# Patient Record
Sex: MALE | Race: WHITE | HISPANIC OR LATINO | Employment: FULL TIME | URBAN - METROPOLITAN AREA
[De-identification: names, ages, dates, MRNs, and addresses within clinical notes are randomized per-mention and may not be internally consistent; named-entity substitution may affect disease eponyms.]

---

## 2017-01-24 ENCOUNTER — GENERIC CONVERSION - ENCOUNTER (OUTPATIENT)
Dept: OTHER | Facility: OTHER | Age: 22
End: 2017-01-24

## 2017-02-24 ENCOUNTER — ALLSCRIPTS OFFICE VISIT (OUTPATIENT)
Dept: OTHER | Facility: OTHER | Age: 22
End: 2017-02-24

## 2017-02-24 DIAGNOSIS — E66.01 MORBID (SEVERE) OBESITY DUE TO EXCESS CALORIES (HCC): ICD-10-CM

## 2017-02-24 DIAGNOSIS — L83 ACANTHOSIS NIGRICANS: ICD-10-CM

## 2017-03-23 ENCOUNTER — GENERIC CONVERSION - ENCOUNTER (OUTPATIENT)
Dept: OTHER | Facility: OTHER | Age: 22
End: 2017-03-23

## 2017-03-23 ENCOUNTER — ALLSCRIPTS OFFICE VISIT (OUTPATIENT)
Dept: OTHER | Facility: OTHER | Age: 22
End: 2017-03-23

## 2017-04-28 ENCOUNTER — GENERIC CONVERSION - ENCOUNTER (OUTPATIENT)
Dept: OTHER | Facility: OTHER | Age: 22
End: 2017-04-28

## 2017-05-16 ENCOUNTER — ANESTHESIA EVENT (OUTPATIENT)
Dept: GASTROENTEROLOGY | Facility: HOSPITAL | Age: 22
End: 2017-05-16
Payer: COMMERCIAL

## 2017-05-17 ENCOUNTER — ANESTHESIA (OUTPATIENT)
Dept: GASTROENTEROLOGY | Facility: HOSPITAL | Age: 22
End: 2017-05-17
Payer: COMMERCIAL

## 2017-05-17 ENCOUNTER — HOSPITAL ENCOUNTER (OUTPATIENT)
Facility: HOSPITAL | Age: 22
Setting detail: OUTPATIENT SURGERY
Discharge: HOME | End: 2017-05-17
Attending: SURGERY | Admitting: SURGERY
Payer: COMMERCIAL

## 2017-05-17 VITALS
HEIGHT: 69 IN | OXYGEN SATURATION: 99 % | BODY MASS INDEX: 46.65 KG/M2 | TEMPERATURE: 98.2 F | WEIGHT: 315 LBS | RESPIRATION RATE: 18 BRPM | HEART RATE: 86 BPM | SYSTOLIC BLOOD PRESSURE: 135 MMHG | DIASTOLIC BLOOD PRESSURE: 70 MMHG

## 2017-05-17 DIAGNOSIS — E66.9 OBESITY: ICD-10-CM

## 2017-05-17 PROCEDURE — 88342 IMHCHEM/IMCYTCHM 1ST ANTB: CPT | Performed by: SURGERY

## 2017-05-17 PROCEDURE — 88305 TISSUE EXAM BY PATHOLOGIST: CPT | Performed by: SURGERY

## 2017-05-17 RX ORDER — PROPOFOL 10 MG/ML
INJECTION, EMULSION INTRAVENOUS AS NEEDED
Status: DISCONTINUED | OUTPATIENT
Start: 2017-05-17 | End: 2017-05-17 | Stop reason: SURG

## 2017-05-17 RX ORDER — SODIUM CHLORIDE 9 MG/ML
125 INJECTION, SOLUTION INTRAVENOUS CONTINUOUS
Status: DISCONTINUED | OUTPATIENT
Start: 2017-05-17 | End: 2017-05-17 | Stop reason: HOSPADM

## 2017-05-17 RX ADMIN — PROPOFOL 200 MG: 10 INJECTION, EMULSION INTRAVENOUS at 07:38

## 2017-05-17 RX ADMIN — SODIUM CHLORIDE 125 ML/HR: 0.9 INJECTION, SOLUTION INTRAVENOUS at 06:25

## 2017-05-25 ENCOUNTER — ALLSCRIPTS OFFICE VISIT (OUTPATIENT)
Dept: OTHER | Facility: OTHER | Age: 22
End: 2017-05-25

## 2017-06-01 ENCOUNTER — APPOINTMENT (OUTPATIENT)
Dept: LAB | Facility: MEDICAL CENTER | Age: 22
End: 2017-06-01
Attending: SURGERY
Payer: COMMERCIAL

## 2017-06-01 ENCOUNTER — TRANSCRIBE ORDERS (OUTPATIENT)
Dept: ADMINISTRATIVE | Facility: HOSPITAL | Age: 22
End: 2017-06-01

## 2017-06-01 DIAGNOSIS — E66.01 MORBID OBESITY, UNSPECIFIED OBESITY TYPE (HCC): ICD-10-CM

## 2017-06-01 DIAGNOSIS — E66.01 MORBID OBESITY, UNSPECIFIED OBESITY TYPE (HCC): Primary | ICD-10-CM

## 2017-06-01 LAB
ALBUMIN SERPL BCP-MCNC: 4.2 G/DL (ref 3.5–5)
ALP SERPL-CCNC: 113 U/L (ref 46–116)
ALT SERPL W P-5'-P-CCNC: 105 U/L (ref 12–78)
ANION GAP SERPL CALCULATED.3IONS-SCNC: 7 MMOL/L (ref 4–13)
AST SERPL W P-5'-P-CCNC: 53 U/L (ref 5–45)
BILIRUB SERPL-MCNC: 0.55 MG/DL (ref 0.2–1)
BUN SERPL-MCNC: 16 MG/DL (ref 5–25)
CALCIUM SERPL-MCNC: 9.2 MG/DL (ref 8.3–10.1)
CHLORIDE SERPL-SCNC: 105 MMOL/L (ref 100–108)
CHOLEST SERPL-MCNC: 206 MG/DL (ref 50–200)
CO2 SERPL-SCNC: 26 MMOL/L (ref 21–32)
CREAT SERPL-MCNC: 0.86 MG/DL (ref 0.6–1.3)
ERYTHROCYTE [DISTWIDTH] IN BLOOD BY AUTOMATED COUNT: 13.5 % (ref 11.6–15.1)
GFR SERPL CREATININE-BSD FRML MDRD: >60 ML/MIN/1.73SQ M
GLUCOSE P FAST SERPL-MCNC: 92 MG/DL (ref 65–99)
HCT VFR BLD AUTO: 45.1 % (ref 36.5–49.3)
HDLC SERPL-MCNC: 37 MG/DL (ref 40–60)
HGB BLD-MCNC: 15.3 G/DL (ref 12–17)
LDLC SERPL CALC-MCNC: 126 MG/DL (ref 0–100)
MCH RBC QN AUTO: 28.4 PG (ref 26.8–34.3)
MCHC RBC AUTO-ENTMCNC: 33.9 G/DL (ref 31.4–37.4)
MCV RBC AUTO: 84 FL (ref 82–98)
PLATELET # BLD AUTO: 316 THOUSANDS/UL (ref 149–390)
PMV BLD AUTO: 9.3 FL (ref 8.9–12.7)
POTASSIUM SERPL-SCNC: 3.8 MMOL/L (ref 3.5–5.3)
PROT SERPL-MCNC: 7.7 G/DL (ref 6.4–8.2)
RBC # BLD AUTO: 5.38 MILLION/UL (ref 3.88–5.62)
SODIUM SERPL-SCNC: 138 MMOL/L (ref 136–145)
TRIGL SERPL-MCNC: 214 MG/DL
WBC # BLD AUTO: 8.96 THOUSAND/UL (ref 4.31–10.16)

## 2017-06-01 PROCEDURE — 36415 COLL VENOUS BLD VENIPUNCTURE: CPT

## 2017-06-01 PROCEDURE — 80061 LIPID PANEL: CPT

## 2017-06-01 PROCEDURE — 85027 COMPLETE CBC AUTOMATED: CPT

## 2017-06-01 PROCEDURE — 80053 COMPREHEN METABOLIC PANEL: CPT

## 2017-06-12 ENCOUNTER — GENERIC CONVERSION - ENCOUNTER (OUTPATIENT)
Dept: OTHER | Facility: OTHER | Age: 22
End: 2017-06-12

## 2017-06-21 ENCOUNTER — APPOINTMENT (OUTPATIENT)
Dept: LAB | Facility: MEDICAL CENTER | Age: 22
End: 2017-06-21
Payer: COMMERCIAL

## 2017-06-21 ENCOUNTER — GENERIC CONVERSION - ENCOUNTER (OUTPATIENT)
Dept: OTHER | Facility: OTHER | Age: 22
End: 2017-06-21

## 2017-06-21 DIAGNOSIS — L83 ACANTHOSIS NIGRICANS: ICD-10-CM

## 2017-06-21 DIAGNOSIS — E66.01 MORBID OBESITY, UNSPECIFIED OBESITY TYPE (HCC): ICD-10-CM

## 2017-06-21 LAB
ALBUMIN SERPL BCP-MCNC: 4 G/DL (ref 3.5–5)
ALP SERPL-CCNC: 102 U/L (ref 46–116)
ALT SERPL W P-5'-P-CCNC: 95 U/L (ref 12–78)
ANION GAP SERPL CALCULATED.3IONS-SCNC: 9 MMOL/L (ref 4–13)
AST SERPL W P-5'-P-CCNC: 40 U/L (ref 5–45)
BILIRUB SERPL-MCNC: 0.54 MG/DL (ref 0.2–1)
BUN SERPL-MCNC: 14 MG/DL (ref 5–25)
CALCIUM SERPL-MCNC: 9.3 MG/DL (ref 8.3–10.1)
CHLORIDE SERPL-SCNC: 104 MMOL/L (ref 100–108)
CHOLEST SERPL-MCNC: 204 MG/DL (ref 50–200)
CO2 SERPL-SCNC: 27 MMOL/L (ref 21–32)
CREAT SERPL-MCNC: 0.8 MG/DL (ref 0.6–1.3)
ERYTHROCYTE [DISTWIDTH] IN BLOOD BY AUTOMATED COUNT: 13.2 % (ref 11.6–15.1)
EST. AVERAGE GLUCOSE BLD GHB EST-MCNC: 126 MG/DL
GFR SERPL CREATININE-BSD FRML MDRD: >60 ML/MIN/1.73SQ M
GLUCOSE P FAST SERPL-MCNC: 90 MG/DL (ref 65–99)
HBA1C MFR BLD: 6 % (ref 4.2–6.3)
HCT VFR BLD AUTO: 44.5 % (ref 36.5–49.3)
HDLC SERPL-MCNC: 33 MG/DL (ref 40–60)
HGB BLD-MCNC: 15.4 G/DL (ref 12–17)
LDLC SERPL CALC-MCNC: 116 MG/DL (ref 0–100)
MCH RBC QN AUTO: 28.7 PG (ref 26.8–34.3)
MCHC RBC AUTO-ENTMCNC: 34.6 G/DL (ref 31.4–37.4)
MCV RBC AUTO: 83 FL (ref 82–98)
PLATELET # BLD AUTO: 313 THOUSANDS/UL (ref 149–390)
PMV BLD AUTO: 9.4 FL (ref 8.9–12.7)
POTASSIUM SERPL-SCNC: 4.1 MMOL/L (ref 3.5–5.3)
PROT SERPL-MCNC: 7.6 G/DL (ref 6.4–8.2)
RBC # BLD AUTO: 5.37 MILLION/UL (ref 3.88–5.62)
SODIUM SERPL-SCNC: 140 MMOL/L (ref 136–145)
TRIGL SERPL-MCNC: 276 MG/DL
WBC # BLD AUTO: 9.32 THOUSAND/UL (ref 4.31–10.16)

## 2017-06-21 PROCEDURE — 80061 LIPID PANEL: CPT

## 2017-06-21 PROCEDURE — 83036 HEMOGLOBIN GLYCOSYLATED A1C: CPT

## 2017-06-21 PROCEDURE — 85027 COMPLETE CBC AUTOMATED: CPT

## 2017-06-21 PROCEDURE — 80053 COMPREHEN METABOLIC PANEL: CPT

## 2017-06-21 PROCEDURE — 36415 COLL VENOUS BLD VENIPUNCTURE: CPT

## 2017-06-22 ENCOUNTER — ALLSCRIPTS OFFICE VISIT (OUTPATIENT)
Dept: OTHER | Facility: OTHER | Age: 22
End: 2017-06-22

## 2017-06-22 DIAGNOSIS — Z01.810 ENCOUNTER FOR PREPROCEDURAL CARDIOVASCULAR EXAMINATION: ICD-10-CM

## 2017-07-06 ENCOUNTER — HOSPITAL ENCOUNTER (OUTPATIENT)
Dept: NON INVASIVE DIAGNOSTICS | Facility: HOSPITAL | Age: 22
Discharge: HOME | End: 2017-07-06
Attending: INTERNAL MEDICINE
Payer: COMMERCIAL

## 2017-07-06 DIAGNOSIS — Z01.810 ENCOUNTER FOR PREPROCEDURAL CARDIOVASCULAR EXAMINATION: ICD-10-CM

## 2017-07-06 PROCEDURE — 93017 CV STRESS TEST TRACING ONLY: CPT

## 2017-07-21 ENCOUNTER — GENERIC CONVERSION - ENCOUNTER (OUTPATIENT)
Dept: OTHER | Facility: OTHER | Age: 22
End: 2017-07-21

## 2017-07-28 ENCOUNTER — ALLSCRIPTS OFFICE VISIT (OUTPATIENT)
Dept: OTHER | Facility: OTHER | Age: 22
End: 2017-07-28

## 2017-08-15 ENCOUNTER — TRANSCRIBE ORDERS (OUTPATIENT)
Dept: ADMINISTRATIVE | Facility: HOSPITAL | Age: 22
End: 2017-08-15

## 2017-08-15 ENCOUNTER — APPOINTMENT (OUTPATIENT)
Dept: LAB | Facility: MEDICAL CENTER | Age: 22
End: 2017-08-15
Attending: SURGERY
Payer: COMMERCIAL

## 2017-08-15 DIAGNOSIS — E66.01 MORBID (SEVERE) OBESITY DUE TO EXCESS CALORIES (HCC): ICD-10-CM

## 2017-08-15 LAB — TSH SERPL DL<=0.05 MIU/L-ACNC: 1.34 UIU/ML (ref 0.36–3.74)

## 2017-08-15 PROCEDURE — 84443 ASSAY THYROID STIM HORMONE: CPT

## 2017-08-15 PROCEDURE — 36415 COLL VENOUS BLD VENIPUNCTURE: CPT

## 2017-08-18 ENCOUNTER — GENERIC CONVERSION - ENCOUNTER (OUTPATIENT)
Dept: OTHER | Facility: OTHER | Age: 22
End: 2017-08-18

## 2017-09-28 ENCOUNTER — ALLSCRIPTS OFFICE VISIT (OUTPATIENT)
Dept: OTHER | Facility: OTHER | Age: 22
End: 2017-09-28

## 2017-10-10 ENCOUNTER — GENERIC CONVERSION - ENCOUNTER (OUTPATIENT)
Dept: OTHER | Facility: OTHER | Age: 22
End: 2017-10-10

## 2017-10-27 NOTE — PROGRESS NOTES
Assessment  1  Morbid obesity (278 01) (E66 01)   2  Impaired fasting glucose (790 21) (R73 01)    Plan  Morbid obesity    · Omeprazole 20 MG Oral Capsule Delayed Release; take 1 capsule daily   Rx By: Efrain Ortiz; Dispense: 30 Days ; #:90 Capsule Delayed Release; Refill: 3;For: Morbid obesity; LUIS = N; Verified Transmission to [facility]; Last Updated By: [email or web address]; 9/28/2017 3:03:47 PM    Discussion/Summary  Discussion Summary:   20 yo male morbidly obese found to be a good candidate to undergo a weight loss operation upon being enrolled here at the [facility]  been pre certified to undergo a Laparoscopic Gastric Bypass  today to review his pre op test results  been medically cleared for the procedure have discussed with him at length the risks and benefits of the operation and reiterated the components of our multidisciplinary program and the importance of compliance and follow up in the post operative period  Although there is a great statistical chance of improvement or even resolution of most of his associated comorbidities, the results vary from patient to patient and they largely depend on his commitment  patient was also instructed with regards to the importance of behavior modification, nutritional counseling, support meeting attendance and lifestyle changes that are important to ensure success  was given the opportunity to ask questions and I have answered all of them have addressed with the patient the level of CODE STATUS for this hospital stay and after explaining the different options currently he wishes to be a Level I  understands and wishes to proceed  needs to lose 16 lbs prior to surgery  return next week for a weight check and if the weight loss is at least half the way there, the surgery date will not be re scheduled  Patient understands and agrees                Chief Complaint  Chief Complaint Free Text Note Form: Patient in office today for final H&P/RNY  History of Present Illness  HPI: 19-year-old male with a long-standing history morbid obesity  was found to be a good candidate to undergo a bariatric operation upon [facility]  here today to discuss details of the surgery  Bariatric Surgery Pre-op History and Physical (Brief) [facility]: The patient is being seen for a pre-op history and physical visit for bariatric surgery  Symptoms:  inability to lose weight  Review of Systems  Complete-Male:   Constitutional: No fever or chills, feels well, no tiredness, no recent weight gain or weight loss  Eyes: No complaints of eye pain, no red eyes, no discharge from eyes, no itchy eyes  ENT: no complaints of earache, no hearing loss, no nosebleeds, no nasal discharge, no sore throat, no hoarseness  Cardiovascular: No complaints of slow heart rate, no fast heart rate, no chest pain, no palpitations, no leg claudication, no lower extremity  Respiratory: No complaints of shortness of breath, no wheezing, no cough, no SOB on exertion, no orthopnea or PND  Gastrointestinal: No complaints of abdominal pain, no constipation, no nausea or vomiting, no diarrhea or bloody stools  Genitourinary: No complaints of dysuria, no incontinence, no hesitancy, no nocturia, no genital lesion, no testicular pain  Musculoskeletal: No complaints of arthralgia, no myalgias, no joint swelling or stiffness, no limb pain or swelling  Integumentary: No complaints of skin rash or skin lesions, no itching, no skin wound, no dry skin  Neurological: No compliants of headache, no confusion, no convulsions, no numbness or tingling, no dizziness or fainting, no limb weakness, no difficulty walking  Psychiatric: Is not suicidal, no sleep disturbances, no anxiety or depression, no change in personality, no emotional problems     Endocrine: No complaints of proptosis, no hot flashes, no muscle weakness, no erectile dysfunction, no deepening of the voice, no feelings of weakness  Hematologic/Lymphatic: No complaints of swollen glands, no swollen glands in the neck, does not bleed easily, no easy bruising  ROS Reviewed:   ROS reviewed  Active Problems  1  Abnormal EKG (794 31) (R94 31)   2  Acanthosis nigricans (701 2) (L83)   3  Impaired fasting glucose (790 21) (R73 01)   4  Morbid obesity (278 01) (E66 01)   5  Preoperative cardiovascular examination (V72 81) (Z01 810)   6  Pre-operative cardiovascular examination (V72 81) (Z01 810)   7  Pre-operative laboratory examination (V72 63) (P88 253)    Past Medical History  1  History of hypertension (V12 59) (Z86 79)  Active Problems And Past Medical History Reviewed: The active problems and past medical history were reviewed and updated today  Surgical History  1  History of Knee Arthroscopy With Medial Meniscus Repair  Surgical History Reviewed: The surgical history was reviewed and updated today  Family History  Mother    1  No pertinent family history  Father    2  No pertinent family history  Family History Reviewed: The family history was reviewed and updated today  Social History   · History of Current some day smoker (305 1) (F17 200)   · Denied: History of Drug use   · Denied: History of Hookah pipe smoker   · Social alcohol use (Z78 9)  Social History Reviewed: The social history was reviewed and updated today  Current Meds   1  Oxycodone-Acetaminophen 5-325 MG Oral Tablet; TAKE 1 TABLET EVERY 4 TO 6   HOURS AS NEEDED FOR PAIN;   Therapy: [id number] to (Evaluate:42Vsn0853); Last Rx:[id number] Ordered  Medication List Reviewed: The medication list was reviewed and updated today  Allergies  1   No Known Drug Allergies    Vitals  Vital Signs    Recorded: 81ZIB3688 02:58PM   Temperature 98 F   Heart Rate 80   Respiration 18   Systolic 143   Diastolic 80   Height 5 ft 8 5 in   Weight 324 lb    BMI Calculated 48 55   BSA Calculated 2 52     Physical Exam    Constitutional   General appearance: No acute distress, well appearing and well nourished  well developed,-- appears healthy,-- morbidly obese,-- well hydrated-- and-- appearance reflects stated age  Eyes   Conjunctiva and lids: No swelling, erythema, or discharge  Ears, Nose, Mouth, and Throat   External inspection of ears and nose: Normal     Oropharynx: Normal with no erythema, edema, exudate or lesions  Pulmonary   Respiratory effort: No increased work of breathing or signs of respiratory distress  Auscultation of lungs: Clear to auscultation, equal breath sounds bilaterally, no wheezes, no rales, no rhonci  Cardiovascular   Auscultation of heart: Normal rate and rhythm, normal S1 and S2, without murmurs  Examination of extremities for edema and/or varicosities: Normal     Abdomen   Abdomen: Non-tender, no masses  The abdomen was obese  Bowel sounds were normal  The abdomen was soft and nontender  The abdomen was normal to percussion  no CVA tenderness   Liver and spleen: No hepatomegaly or splenomegaly  Lymphatic   Palpation of lymph nodes in neck: No lymphadenopathy  Musculoskeletal   Gait and station: Normal     Digits and nails: Normal without clubbing or cyanosis  Inspection/palpation of joints, bones, and muscles: Normal     Skin   Skin and subcutaneous tissue: Normal without rashes or lesions  Neurologic   Sensation: No sensory loss  Psychiatric   Orientation to person, place and time: Normal     Mood and affect: Normal          Future Appointments    Date/Time Provider Specialty Site   10/26/2017 10:45 AM JOHN Webber   General Surgery [place] [address] [facility]     Signatures   Electronically signed by : JOHN Schneider ; Sep 28 2017  3:41PM EST                       (Author)    Electronically signed by : JOHN Schneider ; Sep 28 2017  4:37PM EST                       (Validation)    Electronically signed by : JOHN Schneider ; Sep 28 2017  5:08PM EST                       (Validation)

## 2018-01-11 NOTE — RESULT NOTES
Dear Helen Vanessa,   Your test results have returned and are listed below:      Discussion/Summary  Your results show some abnormalities  Your cholesterol/triglycerides are elevated, which can increase your risk of cardiovascular disease  Please  follow up with your primary care physician for ongoing evaluation and treatment  Your levels will be rechecked after surgery  Your hemoglobin A1c is at 6% , indicating pre diabetes  Please follow up with your primary care physician for ongoing monitoring  Weight loss surgery should improve this level  We did not receive the results of your thyroid stimulating hormone ( TSH)  Enclosed is a lab slip , please get this done as soon as possible  If you have any questions, please don't hesitate to call the office     Sincerely,      Signatures   Electronically signed by : TOMMY Elena; Jul 21 2017  3:34PM EST                       (Author)

## 2018-01-12 VITALS — BODY MASS INDEX: 46.65 KG/M2 | WEIGHT: 315 LBS | HEIGHT: 69 IN

## 2018-01-12 VITALS
SYSTOLIC BLOOD PRESSURE: 138 MMHG | HEIGHT: 69 IN | DIASTOLIC BLOOD PRESSURE: 84 MMHG | WEIGHT: 315 LBS | HEART RATE: 84 BPM | BODY MASS INDEX: 46.65 KG/M2 | TEMPERATURE: 98.3 F

## 2018-01-13 VITALS
DIASTOLIC BLOOD PRESSURE: 80 MMHG | RESPIRATION RATE: 20 BRPM | BODY MASS INDEX: 46.65 KG/M2 | HEART RATE: 92 BPM | WEIGHT: 315 LBS | TEMPERATURE: 98.4 F | HEIGHT: 69 IN | SYSTOLIC BLOOD PRESSURE: 140 MMHG

## 2018-01-13 VITALS
SYSTOLIC BLOOD PRESSURE: 140 MMHG | DIASTOLIC BLOOD PRESSURE: 96 MMHG | HEART RATE: 79 BPM | BODY MASS INDEX: 47.74 KG/M2 | HEIGHT: 68 IN | WEIGHT: 315 LBS

## 2018-01-13 VITALS — HEIGHT: 69 IN | BODY MASS INDEX: 46.65 KG/M2 | WEIGHT: 315 LBS

## 2018-01-13 VITALS — HEIGHT: 69 IN | WEIGHT: 315 LBS | BODY MASS INDEX: 46.65 KG/M2

## 2018-01-13 VITALS
HEART RATE: 86 BPM | TEMPERATURE: 97.8 F | HEIGHT: 68 IN | BODY MASS INDEX: 47.74 KG/M2 | SYSTOLIC BLOOD PRESSURE: 140 MMHG | WEIGHT: 315 LBS | DIASTOLIC BLOOD PRESSURE: 90 MMHG

## 2018-01-13 NOTE — PROGRESS NOTES
Chief Complaint  Chief Complaint Free Text Note Form: Joo Gonsales presents for f/u with RD  1 lbs weight loss since initial eval  Patient continues to choose high fat, high refined CHO sharda dchoices as evidenced by food recall below  Patient continues to skip meals and often will go all day without eating and eat a large meal in the evening  Patient states that school has been a barrier for him with food choices and incorporating more exercise  Days he eats breakfast (3 per week) 8:30am B:tuna sandwich OR frosted flakes with 2% milk OR ham and cheese sandwich L: skips 3-4pm D: Zoup- lobster bisque and 1/2 ham and cheese sandwich, soda Will occasionally get fast food in the evening (burger, fries and soda) Days he skips breakfast: wakes 8:30a B:skips 11a-12p L:fast food (burger, fries, soda) 4-5p D: at home (mom does the cooking) rice and beans, chicken Drinks 72oz water daily, at least 16oz juice/lemonade daily Assess: No changes on Dx or Rx noted  Diagnose: Overweight/Obesity related to positive energy balance as evidenced by BMI >30 and pt's self-reported energy intake  (Continued) Intervene: Goals: 1  consume breakfast at least 5 days per week - prepare the night before - include a lean protein, fruit/vegetable, whole grain 2  pack lunch at least 3 days per week 3  eliminate sugary beverages Materials provided: goals Reviewed workflow with pt: yes Pt verbalized understanding, no further questions at present, expect good compliance  Monitor/Evaluate: Will monitor food journals, weight, pt's reported compliance with goals at next appointment  Follow-up scheduled for: 4/27/17 at 1:30pm with this RD and DESIW ([place])      Active Problems    1  Acanthosis nigricans (701 2) (L83)   2  Morbid obesity (278 01) (E66 01)   3  Pre-operative laboratory examination (V72 63) (W04 525)    Past Medical History    1  History of hypertension (V12 59) (Z86 79)    Surgical History    1   History of Knee Arthroscopy With Medial Meniscus Repair    Family History  Mother    1  No pertinent family history  Father    2  No pertinent family history    Social History    · Current some day smoker (305 1) (F17 200)   · Denied: History of Drug use   · 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)   · Social alcohol use (Z78 9)    Current Meds   1  No Reported Medications Recorded    Allergies    1  No Known Drug Allergies    Vitals  Signs   Recorded: 84SQZ8010 04:03PM   Height: 5 ft 8 5 in  Weight: 323 lb   BMI Calculated: 48 4  BSA Calculated: 2 52    Future Appointments    Date/Time Provider Specialty Site   05/25/2017 01:30 PM JONH Hager   Bariatric Medicine [facility]     Signatures   Electronically signed by : TMOMY Mionr; Mar 23 2017  4:03PM EST                       (Author)    Electronically signed by : JOHN Le ; Mar 23 2017  4:23PM EST                       (Validation)

## 2018-01-13 NOTE — RESULT NOTES
Verified Results  (1) HEMOGLOBIN A1C 21Jun2017 09:54AM Serenity Perdomo    Order Number: [id number]     Test Name Result Flag Reference   HEMOGLOBIN A1C 6 0 %  4 2-6 3   EST  AVG   GLUCOSE 126 mg/dl

## 2018-01-14 VITALS
BODY MASS INDEX: 46.65 KG/M2 | RESPIRATION RATE: 21 BRPM | WEIGHT: 315 LBS | HEART RATE: 86 BPM | DIASTOLIC BLOOD PRESSURE: 80 MMHG | HEIGHT: 69 IN | SYSTOLIC BLOOD PRESSURE: 160 MMHG | TEMPERATURE: 97.8 F

## 2018-01-14 VITALS
BODY MASS INDEX: 46.65 KG/M2 | HEIGHT: 69 IN | WEIGHT: 315 LBS | HEART RATE: 80 BPM | SYSTOLIC BLOOD PRESSURE: 118 MMHG | RESPIRATION RATE: 18 BRPM | DIASTOLIC BLOOD PRESSURE: 80 MMHG | TEMPERATURE: 98 F

## 2018-01-14 VITALS — WEIGHT: 315 LBS | BODY MASS INDEX: 46.65 KG/M2 | HEIGHT: 69 IN

## 2018-01-15 VITALS
WEIGHT: 315 LBS | SYSTOLIC BLOOD PRESSURE: 120 MMHG | BODY MASS INDEX: 46.65 KG/M2 | HEART RATE: 74 BPM | RESPIRATION RATE: 19 BRPM | DIASTOLIC BLOOD PRESSURE: 80 MMHG | TEMPERATURE: 98.1 F | HEIGHT: 69 IN

## 2018-01-15 VITALS — BODY MASS INDEX: 46.65 KG/M2 | HEIGHT: 69 IN | WEIGHT: 315 LBS

## 2018-01-15 NOTE — PROGRESS NOTES
Discussion/Summary  Discussion Summary:   Assess: Pt here for nutrition follow-up  Pt has been working on lesson plans 1-6 in bariatric manual and reading bariatric manual in preparation for surgery  Pt will begin walking his dog for 10-20 minutes 5 days per week  He also plans to purchase a fit bit and to start tracking his steps  Pt will begin taking a daily multivitamin and 2000 IU of vitamin D3  Pt has been drinking 64+ ounces of water or other calorie-free, caffeine-free beverages per day, eating three meals per day, and following the 30/60 minute rule to separate foods and fluids  Per 24 hour recall: B Boiled egg and toast OR tuna sandwich L: Baked potato with broiled salmon or grilled chicken S Carrots with LF ranch D: Protein smoothie- made with soy milk and fruit He is also eating off of a smaller plate to decrease his portions  Diagnose: Overweight/Obesity related to positive energy balance as evidenced by BMI>30, pts self-reported activity level and caloric intake  Intervene: Reviewed Pre-Surgery Goals and Lesson Plans in Bariatric Manuals  Pt will continue to work on these  Reviewed work flow- patient plans to attend support group tonight  Provided with PCP letter template to take to his PCP Goals: Purchase Fit Bit and track steps Continue to follow 30/60 rule Continue to decrease portions by eating off of a smaller plate Attend support group Monitor/Evaluate: Will monitor pts progress, weight, food journals, and available bloodwork at next follow-up  Patient scheduled with program bariatricain next month  Active Problems    1  Acanthosis nigricans (701 2) (L83)   2  Morbid obesity (278 01) (E66 01)   3  Pre-operative laboratory examination (V72 63) (T75 964)    Past Medical History    1  History of hypertension (V12 59) (Z86 79)    Surgical History    1  History of Knee Arthroscopy With Medial Meniscus Repair    Family History  Mother    1  No pertinent family history  Father    2   No pertinent family history    Social History    · Current some day smoker (305 1) (F17 200)   · Denied: History of Drug use   · 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)   · Social alcohol use (Z78 9)    Current Meds   1  No Reported Medications Recorded    Allergies    1  No Known Drug Allergies    Vitals  Signs   Recorded: 21Jun2017 09:50AM   Height: 5 ft 8 5 in  Weight: 321 lb 3 2 oz  BMI Calculated: 48 13  BSA Calculated: 2 51    Future Appointments    Date/Time Provider Specialty Site   07/28/2017 08:45 AM JOHN Craft  Bariatric Medicine [facility]   06/22/2017 08:20 AM JOHN Bose   Cardiology [facility]     Signatures   Electronically signed by : TOMMY Diop; Jun 21 2017  9:50AM EST                       (Author)    Electronically signed by : JOHN Taylor ; Jun 21 2017  2:20PM EST                       (Validation)

## 2018-01-15 NOTE — RESULT NOTES
Dear Rupali Yap,   Your test results have returned and are listed below:      Plan  Acanthosis nigricans    · (1) HEMOGLOBIN A1C; Status:Active; Requested ZWS:[id number]; Morbid obesity    · (1) TSH WITH FT4 REFLEX; Status:Active; Requested  [id number];     Discussion/Summary  Your results have some minor abnormalities, but nothing that is concerning  Your Liver Function Tests (LFT's) are slightly elevated  This is most likely due to inflammation of fatty liver, which is common prior to bariatric surgery and is likely to improve with weight loss and regular exercise  Your cholesterol is mildly elevated, which can increase your risk of cardiovascular disease  Please follow up regularly with your primary care physician and take all medications as prescribed  Please also refer to chapter two in your bariatric manual for more information on dietary control for your cholesterol  Your levels will be rechecked after surgery  We have not yes received the results for your TSH (Thyroid Stimulating Hormone) or HgbA1c (Hemoglobin A1c), which are required prior to surgery  Enclosed is another copy of your lab slip for these two tests  Please have these done at your earliest convenience  If you have already recently had these test done, please forward the results to our office and disregard this lab slip  Your next office appointment is scheduled for July 28, 2017 at 8:45am       If you have any questions, please don't hesitate to call the office     Sincerely,      Signatures   Electronically signed by : SACHA Valadez RD; Jun 12 2017  2:03PM EST                       (Author)    Electronically signed by : JOHN Salcido ; Jose F 15 2017  9:58AM EST                       (Validation)

## 2018-01-18 NOTE — RESULT NOTES
Dear Christina Trejo,   Your test results have returned and are listed below:      Discussion/Summary  Your results are normal       If you have any questions, please don't hesitate to call the office     Sincerely,      Signatures   Electronically signed by : TOMMY Meraz; Aug 18 2017  4:24PM EST                       (Author)

## (undated) DEVICE — SINGLE-USE BIOPSY FORCEPS: Brand: RADIAL JAW 4